# Patient Record
(demographics unavailable — no encounter records)

---

## 2017-12-19 NOTE — REP
Maxillofacial CT study without contrast.

 

History:  Atypical facial pain.  No comparison imaging.

 

Maxillofacial CT findings:  There is a fairly prominent torus mandibulare noted

projecting medially from the anterior portions of the mandibular rami on both

sides.  No bony destructive or erosive changes seen.  There are uninterrupted

wisdom teeth in each side of the mandible.  No maxillary lesion is seen.  Hard

palette is unremarkable.  There is minimal mucosal thickening affecting the right

maxillary sinus inferiorly.  Otherwise, the maxillary sinuses are clear.

Ostiomeatal complexes are patent.  Ethmoid and sphenoid aeration is clear.

Frontal sinuses are clear.  The bony nasal septum is in the midline.  Nasal

turbinates are symmetric.  No nasal polyp is appreciated.  Mastoid aeration is

normal and symmetric.  No abnormality is seen in the petrous bone on either side.

No bony destructive skull base lesion is seen.

 

No intraorbital mass is seen.  The deep facial soft tissues are symmetric.

 

Impression:

 

Fairly prominent torus mandibulare bilaterally.  Minimal mucosal thickening right

maxillary sinus.  Otherwise unremarkable.

 

 

Signed by

Delroy Felix MD 12/19/2017 02:22 P